# Patient Record
Sex: FEMALE | Race: WHITE | Employment: PART TIME | URBAN - METROPOLITAN AREA
[De-identification: names, ages, dates, MRNs, and addresses within clinical notes are randomized per-mention and may not be internally consistent; named-entity substitution may affect disease eponyms.]

---

## 2017-03-07 PROBLEM — E03.9 PRIMARY HYPOTHYROIDISM: Status: ACTIVE | Noted: 2017-03-07

## 2017-03-07 PROBLEM — E06.3 HASHIMOTO'S THYROIDITIS: Status: ACTIVE | Noted: 2017-03-07

## 2017-03-07 PROBLEM — Z78.0 POSTMENOPAUSAL: Status: ACTIVE | Noted: 2017-03-07

## 2017-12-11 ENCOUNTER — HOSPITAL ENCOUNTER (OUTPATIENT)
Dept: LAB | Age: 57
Discharge: HOME | End: 2017-12-11
Payer: COMMERCIAL

## 2017-12-11 DIAGNOSIS — I20.9 ANGINA, CLASS III (HCC): ICD-10-CM

## 2017-12-11 LAB
ANION GAP SERPL CALC-SCNC: 2 MMOL/L
BASOPHILS # BLD: 0.1 K/UL (ref 0–0.2)
BASOPHILS NFR BLD: 1 % (ref 0–2)
BUN SERPL-MCNC: 13 MG/DL (ref 6–23)
CALCIUM SERPL-MCNC: 8.8 MG/DL (ref 8.3–10.4)
CHLORIDE SERPL-SCNC: 105 MMOL/L (ref 98–107)
CO2 SERPL-SCNC: 34 MMOL/L (ref 21–32)
CREAT SERPL-MCNC: 0.7 MG/DL (ref 0.6–1)
DIFFERENTIAL METHOD BLD: ABNORMAL
EOSINOPHIL # BLD: 0.1 K/UL (ref 0–0.8)
EOSINOPHIL NFR BLD: 1 % (ref 0.5–7.8)
ERYTHROCYTE [DISTWIDTH] IN BLOOD BY AUTOMATED COUNT: 13.9 % (ref 11.9–14.6)
GLUCOSE SERPL-MCNC: 95 MG/DL (ref 65–100)
HCT VFR BLD AUTO: 38.6 % (ref 35.8–46.3)
HGB BLD-MCNC: 12.6 G/DL (ref 11.7–15.4)
INR PPP: 0.9
LYMPHOCYTES # BLD: 3 K/UL (ref 0.5–4.6)
LYMPHOCYTES NFR BLD: 35 % (ref 13–44)
MCH RBC QN AUTO: 29.3 PG (ref 26.1–32.9)
MCHC RBC AUTO-ENTMCNC: 32.6 G/DL (ref 31.4–35)
MCV RBC AUTO: 89.8 FL (ref 79.6–97.8)
MONOCYTES # BLD: 0.8 K/UL (ref 0.1–1.3)
MONOCYTES NFR BLD: 9 % (ref 4–12)
NEUTS SEG # BLD: 4.7 K/UL (ref 1.7–8.2)
NEUTS SEG NFR BLD: 54 % (ref 43–78)
PLATELET # BLD AUTO: 338 K/UL (ref 150–450)
PMV BLD AUTO: 9.4 FL (ref 10.8–14.1)
POTASSIUM SERPL-SCNC: 3.6 MMOL/L (ref 3.5–5.1)
PROTHROMBIN TIME: 12.3 SEC (ref 11.5–14.5)
RBC # BLD AUTO: 4.3 M/UL (ref 4.05–5.25)
SODIUM SERPL-SCNC: 141 MMOL/L (ref 136–145)
WBC # BLD AUTO: 8.5 K/UL (ref 4.3–11.1)

## 2017-12-11 PROCEDURE — 36415 COLL VENOUS BLD VENIPUNCTURE: CPT | Performed by: INTERNAL MEDICINE

## 2017-12-11 PROCEDURE — 85025 COMPLETE CBC W/AUTO DIFF WBC: CPT | Performed by: INTERNAL MEDICINE

## 2017-12-11 PROCEDURE — 85610 PROTHROMBIN TIME: CPT | Performed by: INTERNAL MEDICINE

## 2017-12-11 PROCEDURE — 80048 BASIC METABOLIC PNL TOTAL CA: CPT | Performed by: INTERNAL MEDICINE

## 2017-12-13 NOTE — PROGRESS NOTES
Patient pre-assessment complete for [facility] poss with Dr Francisco Mahmood scheduled for [facility], arrival time 6am. Patient verified using . Patient instructed to bring all home medications in labeled bottles on the day of procedure. NPO status reinforced. Patient informed to take a full dose aspirin 325mg  or 81 mg x 4 on the day of procedure. Instructed they can take all other medications excluding vitamins & supplements. Patient verbalizes understanding of all instructions & denies any questions at this time.

## 2017-12-14 ENCOUNTER — HOSPITAL ENCOUNTER (OUTPATIENT)
Dept: CARDIAC CATH/INVASIVE PROCEDURES | Age: 57
Discharge: HOME | End: 2017-12-14
Attending: INTERNAL MEDICINE | Admitting: INTERNAL MEDICINE
Payer: COMMERCIAL

## 2017-12-14 VITALS
HEART RATE: 71 BPM | BODY MASS INDEX: 27.46 KG/M2 | WEIGHT: 155 LBS | DIASTOLIC BLOOD PRESSURE: 80 MMHG | RESPIRATION RATE: 20 BRPM | HEIGHT: 63 IN | OXYGEN SATURATION: 99 % | TEMPERATURE: 98.4 F | SYSTOLIC BLOOD PRESSURE: 148 MMHG

## 2017-12-14 LAB — HCG SERPL QL: NEGATIVE

## 2017-12-14 PROCEDURE — 77030004534 HC CATH ANGI DX INFN CARD -A

## 2017-12-14 PROCEDURE — 77030003394 HC NDL ART COOK -A

## 2017-12-14 PROCEDURE — 77030019569 HC BND COMPR RAD TERU -B

## 2017-12-14 PROCEDURE — 99152 MOD SED SAME PHYS/QHP 5/>YRS: CPT

## 2017-12-14 PROCEDURE — 84703 CHORIONIC GONADOTROPIN ASSAY: CPT | Performed by: INTERNAL MEDICINE

## 2017-12-14 PROCEDURE — 74011250636 HC RX REV CODE- 250/636: Performed by: INTERNAL MEDICINE

## 2017-12-14 PROCEDURE — 74011636320 HC RX REV CODE- 636/320: Performed by: INTERNAL MEDICINE

## 2017-12-14 PROCEDURE — 93458 L HRT ARTERY/VENTRICLE ANGIO: CPT

## 2017-12-14 PROCEDURE — C1894 INTRO/SHEATH, NON-LASER: HCPCS

## 2017-12-14 PROCEDURE — 74011000250 HC RX REV CODE- 250: Performed by: INTERNAL MEDICINE

## 2017-12-14 PROCEDURE — C1769 GUIDE WIRE: HCPCS

## 2017-12-14 PROCEDURE — 74011250636 HC RX REV CODE- 250/636

## 2017-12-14 PROCEDURE — 74011250637 HC RX REV CODE- 250/637: Performed by: INTERNAL MEDICINE

## 2017-12-14 RX ORDER — HEPARIN SODIUM 200 [USP'U]/100ML
3 INJECTION, SOLUTION INTRAVENOUS CONTINUOUS
Status: DISCONTINUED | OUTPATIENT
Start: 2017-12-14 | End: 2017-12-14

## 2017-12-14 RX ORDER — MIDAZOLAM HYDROCHLORIDE 1 MG/ML
.5-2 INJECTION, SOLUTION INTRAMUSCULAR; INTRAVENOUS
Status: DISCONTINUED | OUTPATIENT
Start: 2017-12-14 | End: 2017-12-14

## 2017-12-14 RX ORDER — GUAIFENESIN 100 MG/5ML
81-324 LIQUID (ML) ORAL ONCE
Status: DISCONTINUED | OUTPATIENT
Start: 2017-12-14 | End: 2017-12-14 | Stop reason: HOSPADM

## 2017-12-14 RX ORDER — SODIUM CHLORIDE 9 MG/ML
75 INJECTION, SOLUTION INTRAVENOUS CONTINUOUS
Status: DISCONTINUED | OUTPATIENT
Start: 2017-12-14 | End: 2017-12-14 | Stop reason: HOSPADM

## 2017-12-14 RX ORDER — DIAZEPAM 5 MG/1
5 TABLET ORAL ONCE
Status: COMPLETED | OUTPATIENT
Start: 2017-12-14 | End: 2017-12-14

## 2017-12-14 RX ORDER — LIDOCAINE HYDROCHLORIDE 20 MG/ML
60-200 INJECTION, SOLUTION INFILTRATION; PERINEURAL
Status: DISCONTINUED | OUTPATIENT
Start: 2017-12-14 | End: 2017-12-14

## 2017-12-14 RX ADMIN — MIDAZOLAM HYDROCHLORIDE 2 MG: 1 INJECTION, SOLUTION INTRAMUSCULAR; INTRAVENOUS at 08:49

## 2017-12-14 RX ADMIN — DIAZEPAM 5 MG: 5 TABLET ORAL at 08:01

## 2017-12-14 RX ADMIN — HEPARIN 3 ML/HR: 100 SYRINGE at 08:16

## 2017-12-14 RX ADMIN — MIDAZOLAM HYDROCHLORIDE 2 MG: 1 INJECTION, SOLUTION INTRAMUSCULAR; INTRAVENOUS at 08:46

## 2017-12-14 RX ADMIN — NITROGLYCERIN 0.2 MG: 200 INJECTION, SOLUTION INTRAVENOUS at 08:56

## 2017-12-14 RX ADMIN — HEPARIN 3 ML/HR: 100 SYRINGE at 08:18

## 2017-12-14 RX ADMIN — HEPARIN SODIUM 2 ML: 10000 INJECTION, SOLUTION INTRAVENOUS; SUBCUTANEOUS at 08:49

## 2017-12-14 RX ADMIN — MIDAZOLAM HYDROCHLORIDE 2 MG: 1 INJECTION, SOLUTION INTRAMUSCULAR; INTRAVENOUS at 08:58

## 2017-12-14 RX ADMIN — MIDAZOLAM HYDROCHLORIDE 2 MG: 1 INJECTION, SOLUTION INTRAMUSCULAR; INTRAVENOUS at 08:44

## 2017-12-14 RX ADMIN — SODIUM CHLORIDE 75 ML/HR: 900 INJECTION, SOLUTION INTRAVENOUS at 08:01

## 2017-12-14 RX ADMIN — IOPAMIDOL 90 ML: 755 INJECTION, SOLUTION INTRAVENOUS at 09:01

## 2017-12-14 RX ADMIN — LIDOCAINE HYDROCHLORIDE 60 MG: 20 INJECTION, SOLUTION INFILTRATION; PERINEURAL at 08:47

## 2017-12-14 NOTE — PROGRESS NOTES
TRANSFER - OUT REPORT:    Verbal report given to Zuleima Koehler RN (name) on Monique Calloway  being transferred to [address] [place] [address] (unit) for routine progression of care       Report consisted of patients Situation, Background, Assessment and   Recommendations(SBAR). Information from the following report(s) SBAR was reviewed with the receiving nurse. Lines:   Peripheral IV 12/14/17 Left Antecubital (Active)        Opportunity for questions and clarification was provided. Patient transported with:   Tech         Pt had [address] via R radial approach. Site closed with TR band and 13mL @ 0900. Pt received Versed 8mg IV.

## 2017-12-14 NOTE — IP AVS SNAPSHOT
[place] 
 
 
 [facility] [facility] [address] [facility] 
[phone] Patient: Leanna Form MRN: [id number] FHE:[id number] Discharge Summary 12/14/2017 Tanvi Brown MRN[de-identified]  [id number] Admission Information Provider Pager Service Admission Date Expected D/C Date Tyrone Joshua MD  CARDIAC CATH LAB 12/14/2017 12/14/2017 Actual LOS Patient Class 0 days OUTPATIENT Follow-up Information Follow up With Details Comments Contact Info Claude Dos Santos MD  A follow up appointment has been made for you on Friday, January 5 at 10:30 in the [place] office. [address] [facility] [address] [phone] My Medications TAKE these medications as instructed Instructions Each Dose to Equal  
 Morning Noon Evening Bedtime  
 aspirin, buffered 81 mg Tab Your last dose was: Your next dose is: Take 1 Tab by mouth daily (after lunch). Last dose 5/28/15  
 1 Tab  
    
   
   
   
  
 atorvastatin 20 mg tablet Commonly known as:  LIPITOR Your last dose was: Your next dose is: Take 1 Tab by mouth nightly. Indications: hypercholesterolemia 20 mg  
    
   
   
   
  
 budesonide-formoterol 160-4.5 mcg/actuation Hfaa Commonly known as:  SYMBICORT Your last dose was: Your next dose is: Take 2 Puffs by inhalation two (2) times a day. Indications: EXTRINSIC ASTHMA 2 Puff HYDROcodone-acetaminophen 7.5-325 mg per tablet Commonly known as:  March Castles Your last dose was: Your next dose is: Take 1 Tab by mouth every six (6) hours as needed for Pain. Max Daily Amount: 4 Tabs. Indications: Pain, headache 1 Tab LORazepam 1 mg tablet Commonly known as:  ATIVAN Your last dose was: Your next dose is: Take 1 Tab by mouth two (2) times daily as needed for Anxiety. Max Daily Amount: 2 mg. Indications: anxiety 1 mg  
    
   
   
   
  
 multivitamin tablet Commonly known as:  ONE A DAY Your last dose was: Your next dose is: Take 1 Tab by mouth daily (after lunch). Last dose 5/28/15  Indications: VITAMIN DEFICIENCY PREVENTION  
 1 Tab  
    
   
   
   
  
 nitroglycerin 0.4 mg SL tablet Commonly known as:  NITROSTAT Your last dose was: Your next dose is:    
   
   
 1 Tab by SubLINGual route every five (5) minutes as needed for Chest Pain. 0.4 mg  
    
   
   
   
  
 omeprazole 20 mg capsule Commonly known as:  PRILOSEC Your last dose was: Your next dose is: Take 1 Cap by mouth daily. Indications: gastroesophageal reflux disease 20 mg  
    
   
   
   
  
 rOPINIRole 1 mg tablet Commonly known as:  [address] Your last dose was: Your next dose is:    
   
   
 1 to 2 tablets by mouth at bedtime as needed  Indications: Restless Legs Syndrome General Information Please provide this summary of care documentation to your next provider. Allergies No Known Allergies Current Immunizations  Reviewed on 11/9/2016 Name Date Influenza Vaccine 1/1/2014 Tdap 3/20/2008 Discharge Instructions Discharge Instructions HEART CATHETERIZATION/ANGIOGRAPHY DISCHARGE INSTRUCTIONS 1. Check puncture site frequently for swelling or bleeding. If there is any bleeding, lie down and apply pressure over the area with a clean towel or washcloth and call 911. Notify your doctor for any redness, swelling, drainage, or oozing from the puncture site. Notify your doctor for any fever or chills. 2. If the extremity becomes cold, numb, or painful call [facility] at [phone]. 
3. Activity should be limited for the next 48 hours.  No heavy lifting (anything over 10 pounds) for 3 days. No pushing or pulling with right arm for the next three days. 4. You may resume your usual diet. Drink more fluids than usual. 
5. Have a responsible person drive you home and stay with you for at least 24 hours after your heart catheterization/angiography. No driving for 24 hours. 6. You may remove bandage from your ARM in 24 hours. You may shower in 24 hours. No tub baths, hot tubs, or swimming for 1 week. Do not place any lotions, creams, powders, or ointments over puncture site for 1 week. You may place a clean band-aid over the puncture site each day for 5 days. Change daily. 7. Continue all medications as prescribed. I have read the above instructions and have had the opportunity to ask questions. Patient: ________________________   Date: 12/14/2017 Witness: _______________________   Date: 12/14/2017 Discharge Orders None  
  
` Patient Signature:  ____________________________________________________________ Date:  ____________________________________________________________  
  
 Gina Sheets Provider Signature:  ____________________________________________________________ Date:  ____________________________________________________________

## 2017-12-14 NOTE — PROCEDURES
Brief Cardiac Procedure Note    Patient: Doron Chen MRN: [id number]  SSN: [id number]    YOB: 1960  Age: 56 y.o. Sex: female      Date of Procedure: 12/14/2017     Pre-procedure Diagnosis: Chest pain CCS Class III    Post-procedure Diagnosis: Coronary Artery Disease    Procedure: Left Heart Catheterization    Brief Description of Procedure: rra    Performed By: Paloma Sauer MD     Assistants:     Anesthesia: Moderate Sedation    Estimated Blood Loss: Less than 10 mL      Specimens: None    Implants: None    Findings:   Ef 70  Mild cad    Complications: None    Recommendations: Continue medical therapy.     Signed By: Paloma Sauer MD     December 14, 2017

## 2017-12-14 NOTE — PROGRESS NOTES
1220-patient ambulated to bath room with no difficulties. Right radial with no bleeding or hematoma. Rband removed and sterile dressing applied to site. 1225- all discharge instructions explained to patient and family. Time allowed for questions no. No questions and concerns at this time. 1230- right radial checked. Site with no redness or bleeding. pt discharged to home via Wheelchair with family.

## 2017-12-14 NOTE — DISCHARGE INSTRUCTIONS
HEART CATHETERIZATION/ANGIOGRAPHY DISCHARGE INSTRUCTIONS    1. Check puncture site frequently for swelling or bleeding. If there is any bleeding, lie down and apply pressure over the area with a clean towel or washcloth and call 911. Notify your doctor for any redness, swelling, drainage, or oozing from the puncture site. Notify your doctor for any fever or chills. 2. If the extremity becomes cold, numb, or painful call [facility] Cardiology at [phone].  3. Activity should be limited for the next 48 hours. No heavy lifting (anything over 10 pounds) for 3 days. No pushing or pulling with right arm for the next three days. 4. You may resume your usual diet. Drink more fluids than usual.  5. Have a responsible person drive you home and stay with you for at least 24 hours after your heart catheterization/angiography. No driving for 24 hours. 6. You may remove bandage from your ARM in 24 hours. You may shower in 24 hours. No tub baths, hot tubs, or swimming for 1 week. Do not place any lotions, creams, powders, or ointments over puncture site for 1 week. You may place a clean band-aid over the puncture site each day for 5 days. Change daily. 7. Continue all medications as prescribed. I have read the above instructions and have had the opportunity to ask questions.       Patient: ________________________   Date: 12/14/2017    Witness: _______________________   Date: 12/14/2017

## 2017-12-18 NOTE — PROCEDURES
[facility] CATH    Name:DARBY ERICKSON  MR#: [id number]  : 1960  ACCOUNT #: [de-identified]   DATE OF SERVICE: 2017    DATE OF PROCEDURE:  2017    PROCEDURES PERFORMED:  Cardiac catheterization. HISTORY OF PRESENT ILLNESS:  This is a 33-year-old lady with multiple risk factors, coronary artery disease and chronic chest pain syndrome of uncertain etiology. A diagnostic cardiac catheterization has been recommended. PROCEDURE:  Left heart catheterization with left ventriculography and coronary angiography was carried out from the right radial artery by modified Seldinger technique with a 5-Palauan multipurpose, 4-Palauan #3.5 left and 4-Palauan 3DRC. CIRCULATING NURSE:  Jess Barksdale RN. SEDATION:  A total of 8 mg of Versed was administered. TIME:  The case was started at 8:38 and ended at 8:58. TOTAL CONTRAST LOAD:  90 mL. She tolerated it well. FINDINGS:  The central aortic pressure was 120/70 mmHg. The left ventricular end-diastolic pressure is 16 mmHg. There is no gradient on pullback across the aortic valve. The left ventricular is hyperdynamic. The ejection fraction is 70%. There is no wall motion abnormality appreciated. The left main is normal.  It divides into an LAD and left circumflex. The proximal segment has low-grade plaque formation. The middle segment has mild disease. The distal segment has a long area of mild atherosclerosis. The left circumflex is a big system. It looks fairly normal.      The right coronary artery is also a large vessel. There is mild atherosclerotic irregularity appreciated. IMPRESSION:  1. Hyperdynamic left ventricular function. 2.  Mild nonobstructive coronary artery disease, predominately in the left anterior descending circulation. RECOMMENDATIONS:  Continue medical therapy.       MD WADE Shelton / RN  D: 2017 09:07     T: 2017 14:07  JOB #: [id number]

## 2018-11-29 PROBLEM — J45.30 MILD PERSISTENT ASTHMA WITHOUT COMPLICATION: Status: ACTIVE | Noted: 2018-11-29

## 2019-01-16 ENCOUNTER — HOSPITAL ENCOUNTER (OUTPATIENT)
Dept: GENERAL RADIOLOGY | Age: 59
Discharge: HOME | End: 2019-01-16
Payer: COMMERCIAL

## 2019-01-16 DIAGNOSIS — J45.30 MILD PERSISTENT ASTHMA WITHOUT COMPLICATION: ICD-10-CM

## 2019-01-16 PROCEDURE — 71046 X-RAY EXAM CHEST 2 VIEWS: CPT

## 2019-05-02 ENCOUNTER — HOSPITAL ENCOUNTER (OUTPATIENT)
Dept: MAMMOGRAPHY | Age: 59
Discharge: HOME | End: 2019-05-02
Attending: NURSE PRACTITIONER
Payer: COMMERCIAL

## 2019-05-02 DIAGNOSIS — Z12.31 VISIT FOR SCREENING MAMMOGRAM: ICD-10-CM

## 2019-05-02 PROCEDURE — 77063 BREAST TOMOSYNTHESIS BI: CPT

## 2020-09-28 ENCOUNTER — HOSPITAL ENCOUNTER (OUTPATIENT)
Dept: LAB | Age: 60
Discharge: HOME | End: 2020-09-28
Payer: COMMERCIAL

## 2020-09-28 ENCOUNTER — HOSPITAL ENCOUNTER (OUTPATIENT)
Dept: GENERAL RADIOLOGY | Age: 60
Discharge: HOME | End: 2020-09-28

## 2020-09-28 DIAGNOSIS — R06.02 SHORTNESS OF BREATH: ICD-10-CM

## 2020-09-28 LAB
ALBUMIN SERPL-MCNC: 3.8 G/DL (ref 3.5–5)
ALBUMIN/GLOB SERPL: 1 {RATIO} (ref 1.2–3.5)
ALP SERPL-CCNC: 75 U/L (ref 50–136)
ALT SERPL-CCNC: 45 U/L (ref 12–65)
ANION GAP SERPL CALC-SCNC: 3 MMOL/L (ref 7–16)
AST SERPL-CCNC: 27 U/L (ref 15–37)
BASOPHILS # BLD: 0.1 K/UL (ref 0–0.2)
BASOPHILS NFR BLD: 1 % (ref 0–2)
BILIRUB DIRECT SERPL-MCNC: <0.1 MG/DL
BILIRUB SERPL-MCNC: 0.3 MG/DL (ref 0.2–1.1)
BUN SERPL-MCNC: 12 MG/DL (ref 6–23)
CALCIUM SERPL-MCNC: 9 MG/DL (ref 8.3–10.4)
CHLORIDE SERPL-SCNC: 109 MMOL/L (ref 98–107)
CO2 SERPL-SCNC: 29 MMOL/L (ref 21–32)
CREAT SERPL-MCNC: 0.78 MG/DL (ref 0.6–1)
DIFFERENTIAL METHOD BLD: NORMAL
EOSINOPHIL # BLD: 0.2 K/UL (ref 0–0.8)
EOSINOPHIL NFR BLD: 3 % (ref 0.5–7.8)
ERYTHROCYTE [DISTWIDTH] IN BLOOD BY AUTOMATED COUNT: 14.3 % (ref 11.9–14.6)
GLOBULIN SER CALC-MCNC: 3.7 G/DL (ref 2.3–3.5)
GLUCOSE SERPL-MCNC: 102 MG/DL (ref 65–100)
HCT VFR BLD AUTO: 39.6 % (ref 35.8–46.3)
HGB BLD-MCNC: 13 G/DL (ref 11.7–15.4)
IMM GRANULOCYTES # BLD AUTO: 0 K/UL (ref 0–0.5)
IMM GRANULOCYTES NFR BLD AUTO: 0 % (ref 0–5)
LYMPHOCYTES # BLD: 2.2 K/UL (ref 0.5–4.6)
LYMPHOCYTES NFR BLD: 33 % (ref 13–44)
MCH RBC QN AUTO: 30 PG (ref 26.1–32.9)
MCHC RBC AUTO-ENTMCNC: 32.8 G/DL (ref 31.4–35)
MCV RBC AUTO: 91.2 FL (ref 79.6–97.8)
MONOCYTES # BLD: 0.6 K/UL (ref 0.1–1.3)
MONOCYTES NFR BLD: 9 % (ref 4–12)
NEUTS SEG # BLD: 3.6 K/UL (ref 1.7–8.2)
NEUTS SEG NFR BLD: 54 % (ref 43–78)
NRBC # BLD: 0 K/UL (ref 0–0.2)
PLATELET # BLD AUTO: 328 K/UL (ref 150–450)
PMV BLD AUTO: 10.4 FL (ref 9.4–12.3)
POTASSIUM SERPL-SCNC: 4.1 MMOL/L (ref 3.5–5.1)
PROT SERPL-MCNC: 7.5 G/DL (ref 6.3–8.2)
RBC # BLD AUTO: 4.34 M/UL (ref 4.05–5.2)
SODIUM SERPL-SCNC: 141 MMOL/L (ref 136–145)
TSH SERPL DL<=0.005 MIU/L-ACNC: 2.13 UIU/ML (ref 0.36–3.74)
WBC # BLD AUTO: 6.6 K/UL (ref 4.3–11.1)

## 2020-09-28 PROCEDURE — 85025 COMPLETE CBC W/AUTO DIFF WBC: CPT

## 2020-09-28 PROCEDURE — 84443 ASSAY THYROID STIM HORMONE: CPT

## 2020-09-28 PROCEDURE — 36415 COLL VENOUS BLD VENIPUNCTURE: CPT

## 2020-09-28 PROCEDURE — 80076 HEPATIC FUNCTION PANEL: CPT

## 2020-09-28 PROCEDURE — 80048 BASIC METABOLIC PNL TOTAL CA: CPT

## 2020-09-29 NOTE — PROGRESS NOTES
Please let patient know no major abnormalities on labs. Spoke with patient. Relayed message above. Patient voiced understanding.

## 2020-11-02 PROBLEM — H52.229 REGULAR ASTIGMATISM: Status: ACTIVE | Noted: 2019-03-15

## 2020-11-02 PROBLEM — D12.5 BENIGN NEOPLASM OF SIGMOID COLON: Status: ACTIVE | Noted: 2019-08-16

## 2020-11-02 PROBLEM — K64.4 RESIDUAL HEMORRHOIDAL SKIN TAGS: Status: ACTIVE | Noted: 2019-08-16

## 2020-11-02 PROBLEM — H35.379 EPIRETINAL MEMBRANE: Status: ACTIVE | Noted: 2019-03-15

## 2021-05-07 PROBLEM — F17.211 CIGARETTE NICOTINE DEPENDENCE IN REMISSION: Status: ACTIVE | Noted: 2021-05-07

## 2021-11-12 PROBLEM — R09.82 POST-NASAL DRIP: Status: ACTIVE | Noted: 2021-11-12

## 2021-12-21 ENCOUNTER — TRANSCRIBE ORDER (OUTPATIENT)
Dept: SCHEDULING | Age: 61
End: 2021-12-21

## 2021-12-21 DIAGNOSIS — R63.4 WEIGHT LOSS: ICD-10-CM

## 2021-12-21 DIAGNOSIS — R10.12 LEFT UPPER QUADRANT PAIN: Primary | ICD-10-CM

## 2021-12-29 ENCOUNTER — HOSPITAL ENCOUNTER (OUTPATIENT)
Dept: LAB | Age: 61
Discharge: HOME | End: 2021-12-29

## 2021-12-29 PROCEDURE — 88312 SPECIAL STAINS GROUP 1: CPT

## 2021-12-29 PROCEDURE — 88305 TISSUE EXAM BY PATHOLOGIST: CPT

## 2022-03-19 PROBLEM — F17.211 CIGARETTE NICOTINE DEPENDENCE IN REMISSION: Status: ACTIVE | Noted: 2021-05-07

## 2022-03-19 PROBLEM — K64.4 RESIDUAL HEMORRHOIDAL SKIN TAGS: Status: ACTIVE | Noted: 2019-08-16

## 2022-03-19 PROBLEM — E06.3 HASHIMOTO'S THYROIDITIS: Status: ACTIVE | Noted: 2017-03-07

## 2022-03-19 PROBLEM — R09.82 POST-NASAL DRIP: Status: ACTIVE | Noted: 2021-11-12

## 2022-03-19 PROBLEM — H35.379 EPIRETINAL MEMBRANE: Status: ACTIVE | Noted: 2019-03-15

## 2022-03-19 PROBLEM — E03.9 PRIMARY HYPOTHYROIDISM: Status: ACTIVE | Noted: 2017-03-07

## 2022-03-19 PROBLEM — Z78.0 POSTMENOPAUSAL: Status: ACTIVE | Noted: 2017-03-07

## 2022-03-19 PROBLEM — J45.30 MILD PERSISTENT ASTHMA WITHOUT COMPLICATION: Status: ACTIVE | Noted: 2018-11-29

## 2022-03-20 PROBLEM — D12.5 BENIGN NEOPLASM OF SIGMOID COLON: Status: ACTIVE | Noted: 2019-08-16

## 2022-03-20 PROBLEM — H52.229 REGULAR ASTIGMATISM: Status: ACTIVE | Noted: 2019-03-15

## 2022-10-14 NOTE — PROGRESS NOTES
She is a 72-year-old female with history of mild persistent asthma who is seen today for follow up. To recap,  She was seen 1/16/2019 and treated for exacerbation. She canceled her follow-up appointment for May 2019. Evaluation by cardiology, palpitations, shortness of breath. EF 38-16%, normal diastolic function. Mild LVH. RVSP estimated at 15 mmHg. Reports dx of pneumonia, + COVID (spouse hospitalized). At visit 11/2020, patient reported recent diagnosis of Covid,  was hospitalized with Covid. She reports having \"walking pneumonia\" and had outside chest x-ray at urgent care which was unavailable at time of her visit. Her main symptoms were fever, cough. She had improved symptomatically. DIAGNOSTICS:       CPFt's 2002. CPFT's 3/2003. Normal spirometry, flattening of inspiratory loop. Normal lung volumes, decreased diffusion capacity - normalized after bronchodilator. CXR 2005 - no acute findings. Spirometry 11/2007 - normal spirometry, normal flow volume loop. CXR 11/2015 - outside study, unremarkable. Spirometry 12/2015 - normal.  Spirometry 11/29/2018-suggest restrictive defect, interval decline. CXR 1/16/2019-lungs are clear, no acute abnormality. CXR 9/28/2020-clear lungs, no acute abnormality. Echocardiogram 10/12/2020-EF 18-30%, normal diastolic function. Mild LVH. RVSP estimated at 15 mmHg. Spirometry 11/5/2020-is normal, no significant change. Spirometry 11/12/2021-unable to perform secondary to computer issues.

## 2022-10-17 ENCOUNTER — OFFICE VISIT (OUTPATIENT)
Dept: PULMONOLOGY | Age: 62
End: 2022-10-17
Payer: COMMERCIAL

## 2022-10-17 VITALS
HEIGHT: 62 IN | SYSTOLIC BLOOD PRESSURE: 122 MMHG | DIASTOLIC BLOOD PRESSURE: 84 MMHG | OXYGEN SATURATION: 97 % | TEMPERATURE: 97.1 F | WEIGHT: 139 LBS | BODY MASS INDEX: 25.58 KG/M2 | HEART RATE: 73 BPM

## 2022-10-17 DIAGNOSIS — R09.82 POSTNASAL DRIP: ICD-10-CM

## 2022-10-17 DIAGNOSIS — R07.9 LEFT-SIDED CHEST PAIN: ICD-10-CM

## 2022-10-17 DIAGNOSIS — J45.30 MILD PERSISTENT ASTHMA, UNCOMPLICATED: Primary | ICD-10-CM

## 2022-10-17 DIAGNOSIS — K21.9 GASTRO-ESOPHAGEAL REFLUX DISEASE WITHOUT ESOPHAGITIS: ICD-10-CM

## 2022-10-17 DIAGNOSIS — F17.211 NICOTINE DEPENDENCE, CIGARETTES, IN REMISSION: ICD-10-CM

## 2022-10-17 LAB
EXPIRATORY TIME: NORMAL
FEF 25-75% %PRED-PRE: NORMAL
FEF 25-75% PRED: NORMAL
FEF 25-75%-PRE: NORMAL
FEV1 %PRED-PRE: 104 %
FEV1 PRED: NORMAL
FEV1/FVC %PRED-PRE: NORMAL
FEV1/FVC PRED: NORMAL
FEV1/FVC: 85 %
FEV1: 2.47 L
FVC %PRED-PRE: 94 %
FVC PRED: NORMAL
FVC: 2.89 L
PEF %PRED-PRE: NORMAL
PEF PRED: NORMAL
PEF-PRE: NORMAL

## 2022-10-17 PROCEDURE — 99214 OFFICE O/P EST MOD 30 MIN: CPT | Performed by: NURSE PRACTITIONER

## 2022-10-17 PROCEDURE — 94010 BREATHING CAPACITY TEST: CPT | Performed by: NURSE PRACTITIONER

## 2022-10-17 RX ORDER — ALBUTEROL SULFATE 90 UG/1
AEROSOL, METERED RESPIRATORY (INHALATION)
Qty: 3 EACH | Refills: 3 | Status: SHIPPED | OUTPATIENT
Start: 2022-10-17

## 2022-10-17 RX ORDER — ROPINIROLE 1 MG/1
1 TABLET, FILM COATED ORAL
COMMUNITY

## 2022-10-17 RX ORDER — ESTRADIOL 10 UG/1
TABLET VAGINAL
COMMUNITY
Start: 2022-08-14

## 2022-10-17 RX ORDER — ELECTROLYTES/DEXTROSE
1 SOLUTION, ORAL ORAL
COMMUNITY

## 2022-10-17 RX ORDER — BUDESONIDE AND FORMOTEROL FUMARATE DIHYDRATE 160; 4.5 UG/1; UG/1
AEROSOL RESPIRATORY (INHALATION)
Qty: 3 EACH | Refills: 3 | Status: SHIPPED | OUTPATIENT
Start: 2022-10-17

## 2022-10-17 ASSESSMENT — PULMONARY FUNCTION TESTS
FEV1: 2.47
FVC: 2.89
FEV1_PERCENT_PREDICTED_PRE: 104
FEV1/FVC: 85
FVC_PERCENT_PREDICTED_PRE: 94

## 2022-10-17 ASSESSMENT — ENCOUNTER SYMPTOMS
CONSTIPATION: 0
NAUSEA: 0
ABDOMINAL PAIN: 0
COUGH: 0
WHEEZING: 0
VOMITING: 0
SHORTNESS OF BREATH: 0
DIARRHEA: 0
CHEST TIGHTNESS: 0

## 2022-10-17 NOTE — PROGRESS NOTES
[address] Cynthia May. [address], [address] [facility]  [phone]    Patient Name:  Brittny Holland    YOB: 1960    Office Visit 10/17/2022      CHIEF COMPLAINT:      Chief Complaint   Patient presents with    Asthma         ASSESSMENT:   (Medical Decision Making)                                                                                                                                          Encounter Diagnoses   Name Primary? Mild persistent asthma, uncomplicated Yes    Postnasal drip     Gastro-esophageal reflux disease without esophagitis     Nicotine dependence, cigarettes, in remission     Left-sided chest pain      She is stable from respiratory standpoint, there has been interval improvement in spirometry. She is compliant with maintenance inhaler, taking modified dose of 2 inhalations every morning. She has not required albuterol in some time. Nasal drainage is controlled. Acid reflux is controlled. She remains tobacco free. She does not meet criteria for screening CT. She reports ongoing issues with left sided chest discomfort, anterolateral region described as a throbbing which is intermittent and not definitely related to exertional activity. There is some worsening with deep inspiration and if lying on left side. She has tried ibuprofen without significant benefit. She expresses concern that there could be a \"tumor\" in that area. She has had GI evaluation and reports findings were unremarkable. There is some reproducibility to tenderness over the left posterolateral rib and anterior rib, no palpable masses, deformity or crepitus. Of note she is on estrogen replacement therapy. She has had a history of superficial vein thrombosis following sclerotherapy.     Offered D-dimer, CT with contrast.  She opts to try OTC Voltaren gel to that area since there is some reproducibility suggesting possible musculoskeletal discomfort. I have asked her to contact us if this fails to improve symptoms and we will proceed with chest CT. PLAN:     Continue Symbicort 2 puffs 1-2 times daily as she is doing. May use albuterol 2 puffs 4 times daily if needed for shortness of breath or wheezing. Advised to try OTC Voltaren gel per package directions to left rib cage area. If she has persistent pain that fails to improve or worsens, advised to contact us and we will proceed with PE protocol chest CT. Encouraged COVID boosters per CDC guidelines with newest booster. Prevnar 20 is recommended, prescription provided. Encourage flu vaccine. Follow-up and Dispositions    Return in about 1 year (around 10/17/2023) for MD or Chinmay Mcginnis, asthma, PFT's. KRISH Iyer - CNP    Total  time spent with patient - 52 min. Collaborating MD: Dr. Aletha Rich:    She is a 61-year-old female with history of mild persistent asthma who is seen today for follow up. To recap,  She was seen 1/16/2019 and treated for exacerbation. She canceled her follow-up appointment for May 2019. Evaluation by cardiology, palpitations, shortness of breath. EF 71-46%, normal diastolic function. Mild LVH. RVSP estimated at 15 mmHg. Reports dx of pneumonia, + COVID (spouse hospitalized). At visit 11/2020, patient reported recent diagnosis of Covid,  was hospitalized with Covid. She reports having \"walking pneumonia\" and had outside chest x-ray at urgent care which was unavailable at time of her visit. Her main symptoms were fever, cough. She had improved symptomatically. Today, she reports that she is doing very well from a respiratory standpoint. She denies any significant shortness of breath. Has rare episodes of wheezing, primarily when she is lying down at night and resolve spontaneously. She denies any significant cough. She has had intentional weight loss.     She describes a \"pulsating throbbing/pressing pain\" in the left anterolateral chest wall. States a fullness type sensation that is aggravated by deep breathing or lying on left side. She is undergoing GI evaluation and reports that it was unremarkable. She believes the discomfort is gradually worsening over the past 2 years. There is no definite pattern to this discomfort, not necessarily associated with exertion. She has tried ibuprofen and heating pad for relief without definite benefit. DIAGNOSTICS:       CPFt's 2002. CPFT's 3/2003. Normal spirometry, flattening of inspiratory loop. Normal lung volumes, decreased diffusion capacity - normalized after bronchodilator. CXR 2005 - no acute findings. Spirometry 11/2007 - normal spirometry, normal flow volume loop. CXR 11/2015 - outside study, unremarkable. Spirometry 12/2015 - normal.  Spirometry 11/29/2018-suggest restrictive defect, interval decline. CXR 1/16/2019-lungs are clear, no acute abnormality. CXR 9/28/2020-clear lungs, no acute abnormality. Echocardiogram 10/12/2020-EF 41-81%, normal diastolic function. Mild LVH. RVSP estimated at 15 mmHg. Spirometry 11/5/2020-is normal, no significant change. Spirometry 11/12/2021-unable to perform secondary to computer issues. Spirometry 10/17/2022-normal.  Interval improvement in FVC and FEV1.    _____________________________________________________________      REVIEW OF SYSTEMS:    Review of Systems   Constitutional:  Negative for chills, fatigue, fever and unexpected weight change. Has lost 14 pounds over the past 15 months. Respiratory:  Negative for cough, chest tightness, shortness of breath and wheezing. Cardiovascular:  Positive for chest pain. Negative for palpitations and leg swelling. Gastrointestinal:  Negative for abdominal pain, constipation, diarrhea, nausea and vomiting. Neurological:  Negative for dizziness, tremors, seizures, weakness and headaches.      PHYSICAL EXAM:    Vitals:    10/17/22 1259   BP: 122/84   Pulse: 73   Temp: 97.1 °F (36.2 °C)   TempSrc: Skin   SpO2: 97%   Weight: 139 lb (63 kg)   Height: 5' 2\" (1.575 m)        Body mass index is 25.42 kg/m². GENERAL APPEARANCE:  The patient is normal weight and in no respiratory distress. HEENT:  PERRL. Conjunctivae unremarkable. NECK/LYMPHATIC:   Symmetrical with no elevation of jugular venous pulsation. Trachea midline. No thyroid enlargement. No cervical adenopathy. LUNGS:   Normal respiratory effort with symmetrical lung expansion. Breath sounds-decreased but clear. There is some reproducibility tof tenderness over the left lower posterolateral rib and anterior rib, no palpable masses, deformity or crepitus. HEART:   There is a normal rate and regular rhythm. No murmur, rub, or gallop. There is no edema in the lower extremities. NEURO:  The patient is alert and oriented to person, place, and time. Memory appears intact and mood is normal.  No gross sensorimotor deficits are present. DIAGNOSTIC TESTS: Studies were personally reviewed by me and discussed with the patient. Spirometry:    11/5/2020:          Today        Past Medical History:   Diagnosis Date    Asthma     Attention deficit disorder of adult     GERD (gastroesophageal reflux disease)     Headache     migraines    Hx of skin cancer, basal cell     abd and back    Nausea & vomiting     Psychiatric disorder     Restless leg syndrome     Sinus problem     Thromboembolus ([address] 2004    LLE after scelara therapy       Patient Active Problem List   Diagnosis    Adenomatous colon polyp    Restless leg syndrome    Mild persistent asthma, uncomplicated    Migraine    Gastro-esophageal reflux disease without esophagitis    Nicotine dependence, cigarettes, in remission    Primary hypothyroidism    Epiretinal membrane    Postnasal drip    Postmenopausal    Hashimoto's thyroiditis    Residual hemorrhoidal skin tags    Benign neoplasm of sigmoid colon    Regular astigmatism       Past Surgical History:   Procedure Laterality Date    BREAST SURGERY  2006    Removed 2015    BUNIONECTOMY Bilateral     CARPAL TUNNEL RELEASE Right     CATARACT REMOVAL Bilateral 2016    HAMMER TOE SURGERY Right     2015    MAXILLOFACIAL      SEPTOPLASTY  2007         Social History     Socioeconomic History    Marital status:      Spouse name: None    Number of children: None    Years of education: None    Highest education level: None   Tobacco Use    Smoking status: Former     Packs/day: 0.25     Years: 2.00     Pack years: 0.50     Types: Cigarettes     Quit date: 1989     Years since quittin.9    Smokeless tobacco: Never   Substance and Sexual Activity    Alcohol use: Yes    Drug use: No       Family History   Problem Relation Age of Onset    Breast Cancer Maternal Grandmother [de-identified]    Hypertension Sister     Hypertension Sister     Cancer Sister         melanoma    Kidney Disease Father     Heart Failure Father     Colon Cancer Neg Hx     Hypertension Father     Heart Disease Father         SVT    Thyroid Disease Mother         Hypothyroidism    Heart Disease Mother         pacer    Hypertension Mother     Osteoarthritis Father        No Known Allergies    Current Outpatient Medications   Medication Sig    rOPINIRole (REQUIP) 1 MG tablet Take 1 mg by mouth    Multiple Vitamin (MULTIVITAMIN ADULT) TABS Take 1 tablet by mouth    YUVAFEM 10 MCG TABS vaginal tablet INSERT 1 TABLET (10 MCG) INTO THE VAGINA 3 (THREE) TIMES A WEEK    acebutolol (SECTRAL) 200 MG capsule Take 200 mg by mouth 2 times daily    albuterol sulfate  (90 Base) MCG/ACT inhaler 2 puffs qid prn    atorvastatin (LIPITOR) 20 MG tablet Take 20 mg by mouth    budesonide-formoterol (SYMBICORT) 160-4.5 MCG/ACT AERO 1 - 2 puffs bid, rinse mouth after use.  Indications: allergic asthma    buPROPion (WELLBUTRIN XL) 300 MG extended release tablet Take 300 mg by mouth daily     No current facility-administered medications for this visit. Over 50% of today's office visit was spent in face to face time reviewing test results, prognosis, importance of compliance, education about disease process, benefits of medications, instructions for management of acute symptoms, and follow up plans. Electronically signed. Dictated using voice recognition software.   Proof read but unrecognized errors may exist.

## 2022-10-17 NOTE — PATIENT INSTRUCTIONS
Continue Symbicort 2 puffs 1-2 times daily as she is doing. May use albuterol 2 puffs 4 times daily if needed for shortness of breath or wheezing. Advised to try OTC Voltaren gel per package directions to left rib cage area. If she has persistent pain that fails to improve or worsens, advised to contact us and we will proceed with PE protocol chest CT. Encouraged COVID boosters per CDC guidelines with newest booster. Prevnar 20 is recommended, prescription provided. Encourage flu vaccine.

## 2022-10-26 ENCOUNTER — TELEPHONE (OUTPATIENT)
Dept: PULMONOLOGY | Age: 62
End: 2022-10-26

## 2022-10-26 DIAGNOSIS — R07.9 LEFT-SIDED CHEST PAIN: Primary | ICD-10-CM

## 2022-10-26 NOTE — TELEPHONE ENCOUNTER
Patient states that symptoms are no better. She was told to call if no improvement and triage would schedule CT. Please call her at [phone].

## 2022-10-26 NOTE — TELEPHONE ENCOUNTER
Last seen: 10/17/22  Hx: asthma, PND, GERD    Per notes in last visit:  \"Advised to try OTC Voltaren gel per package directions to left rib cage area. If she has persistent pain that fails to improve or worsens, advised to contact us and we will proceed with PE protocol chest CT. \"    Advised patient that order can be placed, transferred to radiology scheduling w/ information that as this is checked by authorizations dept may require lab work prior, if so we will call her back to set up.

## 2022-11-01 ENCOUNTER — HOSPITAL ENCOUNTER (OUTPATIENT)
Dept: CT IMAGING | Age: 62
Discharge: HOME | End: 2022-11-04
Payer: COMMERCIAL

## 2022-11-01 DIAGNOSIS — R07.9 LEFT-SIDED CHEST PAIN: ICD-10-CM

## 2022-11-01 LAB — CREAT BLD-MCNC: 0.86 MG/DL (ref 0.8–1.5)

## 2022-11-01 PROCEDURE — 6360000004 HC RX CONTRAST MEDICATION: Performed by: NURSE PRACTITIONER

## 2022-11-01 PROCEDURE — 2580000003 HC RX 258: Performed by: NURSE PRACTITIONER

## 2022-11-01 PROCEDURE — 71260 CT THORAX DX C+: CPT | Performed by: NURSE PRACTITIONER

## 2022-11-01 PROCEDURE — 82565 ASSAY OF CREATININE: CPT

## 2022-11-01 RX ORDER — 0.9 % SODIUM CHLORIDE 0.9 %
100 INTRAVENOUS SOLUTION INTRAVENOUS
Status: COMPLETED | OUTPATIENT
Start: 2022-11-01 | End: 2022-11-01

## 2022-11-01 RX ORDER — SODIUM CHLORIDE 0.9 % (FLUSH) 0.9 %
10 SYRINGE (ML) INJECTION
Status: COMPLETED | OUTPATIENT
Start: 2022-11-01 | End: 2022-11-01

## 2022-11-01 RX ADMIN — SODIUM CHLORIDE 100 ML: 9 INJECTION, SOLUTION INTRAVENOUS at 08:41

## 2022-11-01 RX ADMIN — SODIUM CHLORIDE, PRESERVATIVE FREE 10 ML: 5 INJECTION INTRAVENOUS at 08:41

## 2022-11-01 RX ADMIN — IOPAMIDOL 75 ML: 755 INJECTION, SOLUTION INTRAVENOUS at 08:41

## 2023-10-23 ENCOUNTER — OFFICE VISIT (OUTPATIENT)
Dept: ORTHOPEDIC SURGERY | Age: 63
End: 2023-10-23
Payer: COMMERCIAL

## 2023-10-23 VITALS — HEIGHT: 62 IN | WEIGHT: 140 LBS | BODY MASS INDEX: 25.76 KG/M2

## 2023-10-23 DIAGNOSIS — M65.331 TRIGGER MIDDLE FINGER OF RIGHT HAND: ICD-10-CM

## 2023-10-23 DIAGNOSIS — M79.641 RIGHT HAND PAIN: Primary | ICD-10-CM

## 2023-10-23 PROCEDURE — 20550 NJX 1 TENDON SHEATH/LIGAMENT: CPT | Performed by: ORTHOPAEDIC SURGERY

## 2023-10-23 PROCEDURE — 99203 OFFICE O/P NEW LOW 30 MIN: CPT | Performed by: ORTHOPAEDIC SURGERY

## 2023-10-23 RX ORDER — BETAMETHASONE SODIUM PHOSPHATE AND BETAMETHASONE ACETATE 3; 3 MG/ML; MG/ML
6 INJECTION, SUSPENSION INTRA-ARTICULAR; INTRALESIONAL; INTRAMUSCULAR; SOFT TISSUE ONCE
Status: COMPLETED | OUTPATIENT
Start: 2023-10-23 | End: 2023-10-23

## 2023-10-23 RX ORDER — AMITRIPTYLINE HYDROCHLORIDE 25 MG/1
1 TABLET, FILM COATED ORAL NIGHTLY
COMMUNITY
Start: 2023-09-11

## 2023-10-23 RX ORDER — LEVOTHYROXINE SODIUM 0.05 MG/1
TABLET ORAL
COMMUNITY
Start: 2023-09-21

## 2023-10-23 RX ADMIN — BETAMETHASONE SODIUM PHOSPHATE AND BETAMETHASONE ACETATE 6 MG: 3; 3 INJECTION, SUSPENSION INTRA-ARTICULAR; INTRALESIONAL; INTRAMUSCULAR; SOFT TISSUE at 16:09

## 2023-10-23 NOTE — PROGRESS NOTES
Orthopaedic Hand Surgery Note    Name: Jeny Rees  YOB: 1960  Gender: female  MRN: [id number]    CC: New patient referred for hand pain    HPI: Patient is a 66 y.o. female with a chief complaint of right middle clicking, locking and pain, which typically only occurs in the morning. The symptoms have been going on for 3 weeks. ROS/Meds/PSH/PMH/FH/SH: I personally reviewed the patients standard intake form. Pertinents are discussed in the HPI    Physical Examination:  Musculoskeletal:   Examination on the right demonstrates Normal sensation to light touch in the median distribution, normal sensation in ulnar and radial distribution, positive tenderness of the middle A1 pulley with palpable clicking and negative  locking. The extensor tendons all track well over the MCP joints. Imaging / Electrodiagnostic Tests:     Hand XR: AP, Lateral, Oblique     Clinical Indication:  1. Right hand pain    2. Trigger middle finger of right hand           Report: AP, lateral, and oblique x-ray of the right hand demonstrates no bony abnormalities    Impression: as above     Hollie Anthony MD         Assessment:     ICD-10-CM    1. Right hand pain  M79.641 XR HAND RIGHT (MIN 3 VIEWS)      2. Trigger middle finger of right hand  M65.331 betamethasone acetate-betamethasone sodium phosphate (CELESTONE) injection 6 mg     INJECT TENDON SHEATH/LIGAMENT          Plan:  We discussed the diagnosis and different treatment options. From her description of symptoms, she may have an early trigger finger. We discussed observation, splinting, cortisone injections and surgical release of the A1 pulley.  We discussed that stenosing tenosynovitis at the level of the A1 pulley AKA trigger finger is a chronic condition regardless of how long the symptoms have been present, this most likely has been progressing for much longer than the symptoms were evident and it will likely persist for a long time without medical

## 2023-11-06 ENCOUNTER — OFFICE VISIT (OUTPATIENT)
Dept: PULMONOLOGY | Age: 63
End: 2023-11-06
Payer: COMMERCIAL

## 2023-11-06 VITALS
HEIGHT: 62 IN | OXYGEN SATURATION: 96 % | TEMPERATURE: 98.1 F | HEART RATE: 80 BPM | SYSTOLIC BLOOD PRESSURE: 123 MMHG | DIASTOLIC BLOOD PRESSURE: 77 MMHG | BODY MASS INDEX: 25.61 KG/M2 | RESPIRATION RATE: 14 BRPM

## 2023-11-06 DIAGNOSIS — F17.211 NICOTINE DEPENDENCE, CIGARETTES, IN REMISSION: ICD-10-CM

## 2023-11-06 DIAGNOSIS — R09.82 POSTNASAL DRIP: ICD-10-CM

## 2023-11-06 DIAGNOSIS — K21.9 GASTRO-ESOPHAGEAL REFLUX DISEASE WITHOUT ESOPHAGITIS: ICD-10-CM

## 2023-11-06 DIAGNOSIS — J45.30 MILD PERSISTENT ASTHMA, UNCOMPLICATED: Primary | ICD-10-CM

## 2023-11-06 LAB
EXPIRATORY TIME: NORMAL
FEF 25-75% %PRED-PRE: NORMAL
FEF 25-75% PRED: NORMAL
FEF 25-75%-PRE: NORMAL
FEV1 %PRED-PRE: 90 %
FEV1 PRED: NORMAL
FEV1/FVC %PRED-PRE: NORMAL
FEV1/FVC PRED: 83 %
FEV1/FVC: NORMAL
FEV1: 2.1 L
FVC %PRED-PRE: 84 %
FVC PRED: NORMAL
FVC: 2.54 L
PEF %PRED-PRE: NORMAL
PEF PRED: NORMAL
PEF-PRE: NORMAL

## 2023-11-06 PROCEDURE — 94010 BREATHING CAPACITY TEST: CPT | Performed by: INTERNAL MEDICINE

## 2023-11-06 PROCEDURE — 99214 OFFICE O/P EST MOD 30 MIN: CPT | Performed by: NURSE PRACTITIONER

## 2023-11-06 RX ORDER — BUDESONIDE AND FORMOTEROL FUMARATE DIHYDRATE 160; 4.5 UG/1; UG/1
AEROSOL RESPIRATORY (INHALATION)
Qty: 3 EACH | Refills: 3 | Status: SHIPPED | OUTPATIENT
Start: 2023-11-06

## 2023-11-06 RX ORDER — ALBUTEROL SULFATE 90 UG/1
AEROSOL, METERED RESPIRATORY (INHALATION)
Qty: 3 EACH | Refills: 3 | Status: SHIPPED | OUTPATIENT
Start: 2023-11-06

## 2023-11-06 RX ORDER — HYDROCORTISONE ACETATE 25 MG/1
SUPPOSITORY RECTAL
COMMUNITY
Start: 2023-01-12

## 2023-11-06 ASSESSMENT — ENCOUNTER SYMPTOMS
SHORTNESS OF BREATH: 1
COUGH: 0
WHEEZING: 1
HEARTBURN: 1

## 2023-11-06 ASSESSMENT — PULMONARY FUNCTION TESTS
FEV1/FVC_PREDICTED: 83
FEV1: 2.10
FVC: 2.54
FVC_PERCENT_PREDICTED_PRE: 84
FEV1_PERCENT_PREDICTED_PRE: 90

## 2023-11-06 NOTE — PATIENT INSTRUCTIONS
Symbicort 2 puffs twice daily, rinse mouth after use. Albuterol 2 puffs 4 times daily if needed for shortness of breath or wheezing. Consider resuming omeprazole 30 minutes prior to morning meal.  Discussed use of OTC Pepcid Complete, generic chewable berry flavored after evening meal or prior to bedtime. Minimize spicy/acidic foods, caffeine. Avoid eating/drinking 1 to 2 hours prior to lying down. Elevate head of bed. Follow-up in 1 year with spirometry, sooner if needed.

## 2024-10-22 ENCOUNTER — CLINICAL DOCUMENTATION (OUTPATIENT)
Dept: PULMONOLOGY | Age: 64
End: 2024-10-22

## 2024-10-22 NOTE — PROGRESS NOTES
Called pt to reschedule her appointment due to provider being out of office. PT stated she will just call back at a later to to r/s appointment